# Patient Record
Sex: MALE | Race: WHITE | NOT HISPANIC OR LATINO | URBAN - NONMETROPOLITAN AREA
[De-identification: names, ages, dates, MRNs, and addresses within clinical notes are randomized per-mention and may not be internally consistent; named-entity substitution may affect disease eponyms.]

---

## 2018-01-15 ENCOUNTER — OFFICE VISIT CONVERTED (OUTPATIENT)
Dept: FAMILY MEDICINE CLINIC | Age: 33
End: 2018-01-15
Attending: NURSE PRACTITIONER

## 2018-02-21 ENCOUNTER — OFFICE VISIT CONVERTED (OUTPATIENT)
Dept: FAMILY MEDICINE CLINIC | Age: 33
End: 2018-02-21
Attending: NURSE PRACTITIONER

## 2018-05-23 ENCOUNTER — OFFICE VISIT CONVERTED (OUTPATIENT)
Dept: FAMILY MEDICINE CLINIC | Age: 33
End: 2018-05-23
Attending: NURSE PRACTITIONER

## 2018-05-29 ENCOUNTER — OFFICE VISIT CONVERTED (OUTPATIENT)
Dept: FAMILY MEDICINE CLINIC | Age: 33
End: 2018-05-29
Attending: FAMILY MEDICINE

## 2018-06-13 ENCOUNTER — OFFICE VISIT CONVERTED (OUTPATIENT)
Dept: SURGERY | Facility: CLINIC | Age: 33
End: 2018-06-13
Attending: PHYSICIAN ASSISTANT

## 2018-07-16 ENCOUNTER — OFFICE VISIT CONVERTED (OUTPATIENT)
Dept: UROLOGY | Facility: CLINIC | Age: 33
End: 2018-07-16
Attending: UROLOGY

## 2018-12-03 ENCOUNTER — OFFICE VISIT CONVERTED (OUTPATIENT)
Dept: FAMILY MEDICINE CLINIC | Age: 33
End: 2018-12-03
Attending: NURSE PRACTITIONER

## 2019-08-19 ENCOUNTER — OFFICE VISIT CONVERTED (OUTPATIENT)
Dept: UROLOGY | Facility: CLINIC | Age: 34
End: 2019-08-19
Attending: UROLOGY

## 2019-08-23 ENCOUNTER — HOSPITAL ENCOUNTER (OUTPATIENT)
Dept: OTHER | Facility: HOSPITAL | Age: 34
Discharge: HOME | End: 2019-08-23
Attending: UROLOGY

## 2019-08-30 ENCOUNTER — OFFICE VISIT CONVERTED (OUTPATIENT)
Dept: UROLOGY | Facility: CLINIC | Age: 34
End: 2019-08-30
Attending: UROLOGY

## 2021-05-15 VITALS — HEIGHT: 75 IN | RESPIRATION RATE: 16 BRPM | BODY MASS INDEX: 35.71 KG/M2 | WEIGHT: 287.25 LBS

## 2021-05-15 VITALS — RESPIRATION RATE: 16 BRPM | BODY MASS INDEX: 35.61 KG/M2 | HEIGHT: 75 IN | WEIGHT: 286.37 LBS

## 2021-05-16 VITALS — HEIGHT: 75 IN | RESPIRATION RATE: 12 BRPM | WEIGHT: 258 LBS | BODY MASS INDEX: 32.08 KG/M2

## 2021-05-16 VITALS — WEIGHT: 263.25 LBS | BODY MASS INDEX: 32.73 KG/M2 | HEIGHT: 75 IN | RESPIRATION RATE: 14 BRPM

## 2021-05-18 NOTE — PROGRESS NOTES
Martinez Hever SYD 1985     Office/Outpatient Visit    Visit Date: Mon, Dec 3, 2018 08:22 am    Provider: Em Bassett N.P. (Assistant: Hortencia Khan MA)    Location: [facility]        Electronically signed by Em Bassett N.P. on  12/03/2018 08:54:12 AM                             SUBJECTIVE:        CC:     Dickson is a 33 year old White male.  This is a follow-up visit.  Patient presents today for medication refills         HPI: Dickson is here today to follow up on his HTN and palpitations. He had been experiencing palpitations while at rest. Holter monitor was performed and showed rare PVCs. Labs were normal. He was started on Toprol XL. He notes that this has helped with the palpitations as well as his blood pressure. He has continued to avoid caffeine. Has only had palpitations when he has felt increasingly anxious or stressed. No longer having palpitations at rest. Does have some episodes of anxiety but these are manageable. Does now check his blood pressure other than his visits.    He had some hematuria and renal stone in May/June. He saw urology Dr. Vidales. Reports only one recurrent episode of blood in urine. Cystoscopy recommended but has not had performed yet.     Needs TB skin test for school. He is at [facility] for [facility].              ROS:     CONSTITUTIONAL:  Negative for chills and fever.      CARDIOVASCULAR:  Negative for chest pain and palpitations.      RESPIRATORY:  Negative for dyspnea and frequent wheezing.      GASTROINTESTINAL:  Negative for abdominal pain and vomiting.      GENITOURINARY:  Positive for hematuria (none current).   Negative for dysuria or polyuria.      NEUROLOGICAL:  Negative for dizziness and headaches.      PSYCHIATRIC:  Positive for anxiety ( (doing well) ).   Negative for suicidal thoughts.          PMH/FMH/SH:     Last Reviewed on 12/03/2018 08:34 AM by Em Bassett    Past Medical History:             PAST MEDICAL HISTORY         broken ribs after bronchitis          Surgical History:     NONE         Family History:     Father: enlarged heart, leaky valve     Mother: Healthy     Brother(s): 1 brother(s) total; 1 ;  accidental death at 27     Paternal Grandfather: Cause of death was leukemia     Paternal Grandmother: ;  Coronary Artery Disease;  Type 2 Diabetes     Maternal Grandfather: ;  Type 2 Diabetes     Maternal Grandmother: Cause of death was colon cancer         Social History:     Occupation: (Self-Employed) . Student. [employer] for [employer];     Marital Status: Single     Children: None         Tobacco/Alcohol/Supplements:     Last Reviewed on 2018 08:34 AM by Em Bassett    Tobacco: He has a past history of cigarette smoking; quit date:  2012.          Alcohol: Frequency:    rarely;         Substance Abuse History:     Last Reviewed on 2018 08:34 AM by Em Bassett    None         Mental Health History:     Last Reviewed on 2018 08:34 AM by Em Bassett    NEGATIVE         Communicable Diseases (eg STDs):     Last Reviewed on 2018 08:34 AM by Em Bassett    Reportable health conditions; NEGATIVE             Current Problems:     Last Reviewed on 2018 08:34 AM by Em Bassett    Renal stone     Use of high risk medications     Gross hematuria     Anxiety     Joint pain, other specified site     Hypertension     Palpitations         Immunizations:     Fluzone pf (3+ years dose) 1/15/2018     Fluzone Quadrivalent (3+ years) 10/28/2018     PPD 1/15/2018     PPD 2018     Adacel (Tdap) 1/15/2018         Allergies:     Last Reviewed on 2018 08:34 AM by Em Bassett    Amoxicillin:        Current Medications:     Last Reviewed on 2018 08:34 AM by Em Bassett    Toprol XL 25mg Tablets, Extended Release Take 1 tablet(s) by mouth daily         OBJECTIVE:        Vitals:         Current: 12/3/2018 8:26:31 AM    Ht:  6 ft, 3 in;  Wt: 272.8 lbs;  BMI: 34.1    T: 98.2 F (oral);  BP: 132/77 mm Hg (right  arm, sitting);  P: 65 bpm (right arm (BP Cuff), sitting);  sCr: 0.8 mg/dL;  GFR: 158.48        Exams:     PHYSICAL EXAM:     GENERAL: well developed, well nourished;  no apparent distress;     RESPIRATORY: normal respiratory rate and pattern with no distress; normal breath sounds with no rales, rhonchi, wheezes or rubs;     CARDIOVASCULAR: normal rate; rhythm is regular;     GASTROINTESTINAL: nontender; normal bowel sounds; no organomegaly;     MUSCULOSKELETAL: normal gait;     NEUROLOGIC: mental status: alert and oriented x 3; GROSSLY INTACT     PSYCHIATRIC: appropriate affect and demeanor;         Procedures:     TB screening     1. PPD placement: 0.1 cc unit dose given IM in the left upper arm; administered by [facility];  lot number [id number]; expires 12/7/20 PPD GIVEN AT 8:48AM             ASSESSMENT           401.1   I10  Hypertension              DDx:     785.1   R00.2  Palpitations              DDx:     592.0   N20.0  Renal stone              DDx:     599.71   R31.0  Gross hematuria              DDx:     V74.1   Z11.1  TB screening              DDx:         ORDERS:         Meds Prescribed:       Refill of: Toprol XL (Metoprolol Succinate) 25mg Tablets, Extended Release Take 1 tablet(s) by mouth daily  #30 (Thirty) tablet(s) Refills: 5         Lab Orders:       [id number]  [facility] CBC w/o diff  (Send-Out)         34283  COMP [facility] Comp. Metabolic Panel  (Send-Out)         76265  PPD TB test  (In-House)         APPTO  Appointment need  (In-House)                   PLAN:          Hypertension     LABORATORY:  Labs ordered to be performed today include CBC W/O DIFF and Comprehensive metabolic panel.      RECOMMENDATIONS given include: keep blood pressure log as directed and eat a low salt diet.      FOLLOW-UP: Schedule a follow-up visit in 6 months. Chronic visit follow up for Annual Checkup Patient will call to schedule follow-up appointment           Prescriptions:       Refill of: Toprol XL (Metoprolol Succinate) 25mg  Tablets, Extended Release Take 1 tablet(s) by mouth daily  #30 (Thirty) tablet(s) Refills: 5           Orders:       [id number]  BDCB2 - [facility] CBC w/o diff  (Send-Out)         63063  COMP - [facility] Comp. Metabolic Panel  (Send-Out)         APPTO  Appointment need  (In-House)            Palpitations Continue Toprol XL.          Renal stone Continue increased intake of decaffeinated fluids. Follow up with urology. Recommend cystoscopy.          Gross hematuria As above.          TB screening Follow up in 48 to 72 hours to have read.         IMMUNIZATIONS given today: PPD placed..            Orders:       [id number]  PPD TB test  (In-House)               Patient Recommendations:        For  Hypertension:     Keep a daily blood pressure log and report elevated blood pressure to provider as directed.  Schedule a follow-up visit in 6 months.              CHARGE CAPTURE           **Please note: ICD descriptions below are intended for billing purposes only and may not represent clinical diagnoses**        Primary Diagnosis:         401.1 Hypertension            I10    Essential (primary) hypertension              Orders:          [id number]   Office/outpatient visit; established patient, level 4  (In-House)             APPTO   Appointment need  (In-House)           785.1 Palpitations            R00.2    Palpitations    592.0 Renal stone            N20.0    Calculus of kidney    599.71 Gross hematuria            R31.0    Gross hematuria    V74.1 TB screening            Z11.1    Encounter for screening for respiratory tuberculosis              Orders:          [id number]   PPD TB test  (In-House)               ADDENDUMS:      ____________________________________    Addendum: 12/05/2018 10:57 AM - Rust, Dorinda        PPD 0mm induration noted./pr

## 2021-05-18 NOTE — PROGRESS NOTES
Martinez Hever SYD 1985     Office/Outpatient Visit    Visit Date: Wed, May 23, 2018 02:26 pm    Provider: Em Bassett N.P. (Assistant: Maren Lundy RN)    Location: [facility]        Electronically signed by Em Bassett N.P. on  2018 08:31:11 PM                             SUBJECTIVE:        CC:     Dickson is a 32 year old White male.  Medication Refill         HPI: Dickson is here today to follow up on his HTN and palpitations. He had been experiencing palpitations while at rest. Holter monitor was performed and showed rare PVCs. Labs were normal. He was started on Toprol XL. He notes that this has helped with the palpitations as well as his blood pressure. He has continued to avoid caffeine. Has only had palpitations when he has felt increasingly anxious or stressed. No longer having palpitations at rest. Does have some episodes of anxiety. He notes that he has had some joint pains at times. Has been eating gluten free diet and has felt better. Wonders if he might have celiac disease.         ROS:     CONSTITUTIONAL:  Negative for chills and fever.      CARDIOVASCULAR:  Positive for palpitations ( improved ).   Negative for chest pain.      RESPIRATORY:  Negative for dyspnea and frequent wheezing.      MUSCULOSKELETAL:  Negative for joint stiffness and myalgias.      NEUROLOGICAL:  Negative for dizziness and headaches.      PSYCHIATRIC:  Positive for anxiety.   Negative for suicidal thoughts.          PM/FM/:     Last Reviewed on 2017 05:37 AM by Em Bassett    Past Medical History:             PAST MEDICAL HISTORY         broken ribs after bronchitis         Surgical History:     NONE         Family History:     Father: enlarged heart, leaky valve     Mother: Healthy     Brother(s): 1 brother(s) total; 1 ;  accidental death at 27     Paternal Grandfather: Cause of death was leukemia     Paternal Grandmother: ;  Coronary Artery Disease;  Type 2 Diabetes      Maternal Grandfather: ;  Type 2 Diabetes     Maternal Grandmother: Cause of death was colon cancer         Social History:     Occupation: (Self-Employed) . Student. [employer] for [employer];     Marital Status: Single     Children: None         Tobacco/Alcohol/Supplements:     Last Reviewed on 2018 05:27 PM by Constanza Booth    Tobacco: He has a past history of cigarette smoking; quit date:  2012.          Alcohol: Frequency:    rarely;         Substance Abuse History:     Last Reviewed on 2017 05:37 AM by Em Bassett    None         Mental Health History:     Last Reviewed on 2017 05:37 AM by Em Bassett    NEGATIVE         Communicable Diseases (eg STDs):     Last Reviewed on 2017 05:37 AM by Em Bassett    Reportable health conditions; NEGATIVE             Current Problems:     Last Reviewed on 2017 05:37 AM by Em Bassett    Hypertension     Palpitations         Immunizations:     Fluzone pf (3+ years dose) 1/15/2018     PPD 1/15/2018     PPD 2018     Adacel (Tdap) 1/15/2018         Allergies:     Last Reviewed on 2018 02:29 PM by Maren Lundy    Amoxicillin:        Current Medications:     Last Reviewed on 2018 02:29 PM by Mraen Lundy    Toprol XL 25mg Tablets, Extended Release Take 1 tablet(s) by mouth daily         OBJECTIVE:        Vitals:         Historical:     2018  Wt:   260.1lbs        Current: 2018 2:29:03 PM    Ht:  6 ft, 3 in;  Wt: 255 lbs;  BMI: 31.9    T: 97.9 F (oral);  BP: 122/70 mm Hg (right arm, sitting);  P: 76 bpm (right arm (BP Cuff), sitting);  sCr: 0.94 mg/dL;  GFR: 132.26        Exams:     PHYSICAL EXAM:     GENERAL: well developed, well nourished;  no apparent distress;     RESPIRATORY: normal respiratory rate and pattern with no distress; normal breath sounds with no rales, rhonchi, wheezes or rubs;     CARDIOVASCULAR: normal rate; rhythm is regular;     MUSCULOSKELETAL: normal gait; normal range of motion of all  major muscle groups; no limb or joint pain with range of motion;     NEUROLOGIC: mental status: alert and oriented x 3; GROSSLY INTACT     PSYCHIATRIC: appropriate affect and demeanor; normal psychomotor function; normal speech pattern; normal thought and perception;         ASSESSMENT           719.48   M25.50  Joint pain, other specified site              DDx:     401.1   I10  Hypertension              DDx:     785.1   R00.2  Palpitations              DDx:     300.02   F41.9  Anxiety              DDx:         ORDERS:         Meds Prescribed:       Refill of: Toprol XL (Metoprolol Succinate) 25mg Tablets, Extended Release Take 1 tablet(s) by mouth daily  #30 (Thirty) tablet(s) Refills: 5         Lab Orders:       [id number]  RAPII - [facility] Arthritis Profile  (Send-Out)         80058  COMP - [facility] Comp. Metabolic Panel  (Send-Out)         69490  CELID - [facility] - Celiac Antibodies Panel  (Send-Out)         APPTO  Appointment need  (In-House)                   PLAN:          Joint pain, other specified site     LABORATORY:  Labs ordered to be performed today include Arthritis Profile and Celiac Antibody Panel.      RECOMMENDATIONS given include: Further recommendation to be given after test results are complete.            Orders:       [id number]  [facility]APII - [facility] Arthritis Profile  (Send-Out)         02526  CELID - [facility] - Celiac Antibodies Panel  (Send-Out)            Hypertension     LABORATORY:  Labs ordered to be performed today include Comprehensive metabolic panel.      FOLLOW-UP: Schedule a follow-up visit in 6 months.            Prescriptions:       Refill of: Toprol XL (Metoprolol Succinate) 25mg Tablets, Extended Release Take 1 tablet(s) by mouth daily  #30 (Thirty) tablet(s) Refills: 5           Orders:       [id number]  COMP - [facility] Comp. Metabolic Panel  (Send-Out)         APPTO  Appointment need  (In-House)            Palpitations Continue Toprol XL as above          Anxiety         RECOMMENDATIONS given include: Counseling.             Patient Education Handouts:       [facility]              Patient Recommendations:        For  Joint pain, other specified site:     I also recommend ^.          For  Hypertension:     Schedule a follow-up visit in 6 months.              CHARGE CAPTURE           **Please note: ICD descriptions below are intended for billing purposes only and may not represent clinical diagnoses**        Primary Diagnosis:         719.48 Joint pain, other specified site            M25.50    Pain in unspecified joint              Orders:          93910   Office/outpatient visit; established patient, level 4  (In-House)           401.1 Hypertension            I10    Essential (primary) hypertension              Orders:          APPTO   Appointment need  (In-House)           785.1 Palpitations            R00.2    Palpitations    300.02 Anxiety            F41.9    Anxiety disorder, unspecified

## 2021-05-18 NOTE — PROGRESS NOTES
Hever Henriquez 1985     Office/Outpatient Visit    Visit Date: Tue, May 29, 2018 11:07 am    Provider: Craig Kirk MD (Assistant: Iona Chan MA)    Location: [facility]        Electronically signed by Craig Kirk MD on  2018 05:47:24 PM                             SUBJECTIVE:        CC: hematuria         HPI:     Dickson is in today for follow up on blood in the urine.  He first noted this on  evening.  He says that the urine was dark and then he developed some red blood yesterday.  His urine looked normal today.  He has not had any pain with urination.  He denies drainage from the penis.  He is circumcised.  He has never had a kidney stone.  He is in no pain.     ROS:     CONSTITUTIONAL:  Negative for chills and fever.      CARDIOVASCULAR:  Negative for chest pain and palpitations.      RESPIRATORY:  Negative for recent cough and dyspnea.      GASTROINTESTINAL:  Negative for abdominal pain, nausea and vomiting.      INTEGUMENTARY:  Negative for atypical mole(s) and rash.          [facility]/:     Last Reviewed on 2018 11:25 AM by Craig Kirk    Past Medical History:             PAST MEDICAL HISTORY         broken ribs after bronchitis         Surgical History:     NONE         Family History:     Father: enlarged heart, leaky valve     Mother: Healthy     Brother(s): 1 brother(s) total; 1 ;  accidental death at 27     Paternal Grandfather: Cause of death was leukemia     Paternal Grandmother: ;  Coronary Artery Disease;  Type 2 Diabetes     Maternal Grandfather: ;  Type 2 Diabetes     Maternal Grandmother: Cause of death was colon cancer         Social History:     Occupation: (Self-Employed) . Student. [employer] for [employer];     Marital Status: Single     Children: None         Tobacco/Alcohol/Supplements:     Last Reviewed on 2018 11:25 AM by Craig Kirk    Tobacco: He has a past history of cigarette smoking;  quit date:  02/2012.          Alcohol: Frequency:    rarely;         Substance Abuse History:     Last Reviewed on 5/29/2018 11:25 AM by Craig Kirk    None         Mental Health History:     Last Reviewed on 5/29/2018 11:25 AM by Craig Kirk    NEGATIVE         Communicable Diseases (eg STDs):     Last Reviewed on 5/29/2018 11:25 AM by Craig Kirk    Reportable health conditions; NEGATIVE             Current Problems:     Last Reviewed on 5/29/2018 11:25 AM by Craig Kirk    Gross hematuria     Anxiety     Joint pain, other specified site     Hypertension     Palpitations         Immunizations:     Fluzone pf (3+ years dose) 1/15/2018     PPD 1/15/2018     PPD 1/29/2018     Adacel (Tdap) 1/15/2018         Allergies:     Last Reviewed on 5/29/2018 11:25 AM by Craig Kirk    Amoxicillin:        Current Medications:     Last Reviewed on 5/29/2018 11:25 AM by Craig Kirk    Toprol XL 25mg Tablets, Extended Release Take 1 tablet(s) by mouth daily         OBJECTIVE:        Vitals:         Current: 5/29/2018 11:10:04 AM    Ht:  6 ft, 3 in;  Wt: 256.6 lbs;  BMI: 32.1    T: 97.6 F (oral);  BP: 130/66 mm Hg (right arm, sitting);  P: 66 bpm (right arm (BP Cuff), sitting);  sCr: 0.8 mg/dL;  GFR: 155.82        Exams:     PHYSICAL EXAM:     GENERAL: Vitals recorded well developed, well nourished;     EYES: extraocular movements intact; conjunctiva and cornea are normal; PERRL;     E/N/T: EARS:  normal external auditory canals and tympanic membranes;  grossly normal hearing; OROPHARYNX:  normal mucosa, dentition, gingiva, and posterior pharynx;     NECK: range of motion is normal; thyroid is non-palpable;     RESPIRATORY: normal respiratory rate and pattern with no distress; normal breath sounds with no rales, rhonchi, wheezes or rubs;     CARDIOVASCULAR: normal rate; rhythm is regular;  no systolic murmur;     GASTROINTESTINAL: nontender; normal bowel sounds; no  masses;     SKIN:  no significant rashes or lesions; no suspicious moles;         ASSESSMENT           599.71   R31.0  Gross hematuria              DDx:     V58.69   Z79.899  Use of high risk medications              DDx:         ORDERS:         Lab Orders:       [id number]  CTNGX- [facility] Chlamydia GC swab or urine (08508, 54086)  (Send-Out)         85023  BDUAM - [facility] Urinalysis, automated, with micro  (Send-Out)         87159  URCU - [facility] Urine Culture  (Send-Out)         01637  AHEP4 - [facility] Hepatitis Panel (HAAb, HbcAB, HbsAG, Hep C antibody)  (Send-Out)         75138  HIV - [facility] HIV-1 and HIV-2 Ab   (Send-Out)         39950  RPR - [facility] RPR, Rfx Qn RPR/Confirm TP  (Send-Out)                   PLAN:          Gross hematuria     LABORATORY:  Labs ordered to be performed today include GC and Chlamydia [facility], urinalysis with micro, and Urine culture.      RECOMMENDATIONS given include: Dickson seems well today.  I don't have a clear idea of what is causing the blood.  We will run urine testing and then contact him with the results.  We also discussed getting some additional screening blood work for sexually transmitted infections.  We will move ahead with that.  No other changes for now..            Orders:       [id number]  CTNGX- [facility] Chlamydia GC swab or urine (96718, 30574)  (Send-Out)         48727  BDUAM - [facility] Urinalysis, automated, with micro  (Send-Out)         73184  URCU - [facility] Urine Culture  (Send-Out)             Patient Education Handouts:       [facility] Medication Compliance           Use of high risk medications     LABORATORY:  Labs ordered to be performed today include hepatitis panel, HIV, and RPR.            Orders:       [id number]  AHEP4 - [facility] Hepatitis Panel (HAAb, HbcAB, HbsAG, Hep C antibody)  (Send-Out)         72990  HIV - [facility] HIV-1 and HIV-2 Ab   (Send-Out)         03862  RPR - [facility] RPR, Rfx Qn RPR/Confirm TP  (Send-Out)               CHARGE CAPTURE           **Please note: ICD descriptions below are intended for  billing purposes only and may not represent clinical diagnoses**        Primary Diagnosis:         599.71 Gross hematuria            R31.0    Gross hematuria              Orders:          55210   Office/outpatient visit; established patient, level 4  (In-House)           V58.69 Use of high risk medications            Z79.899    Other long term (current) drug therapy        ADDENDUMS:      ____________________________________    Date: 06/05/2018 01:06 PM    Author: Radha Sigala         Visit Note Faxed to:        Anthony Vidales  (Surgery, Urological); Number [phone]

## 2021-05-18 NOTE — PROGRESS NOTES
Kuldeep Henriquezew SYD 1985     Office/Outpatient Visit    Visit Date:  05:25 pm    Provider: Em Bassett N.P. (Assistant: Constanza Booth MA)    Location: [facility]        Electronically signed by Em Bassett N.P. on  2018 08:49:40 AM                             SUBJECTIVE:        CC:     Dickson is a 32 year old White male.  Patient is here for check up in regards to Toprol XL.          HPI: Dickson is here today to follow up on his HTN and palpitations. He had been experiencing palpitations while at rest. Holter monitor was performed and showed rare PVCs. Labs were normal. He was started on Toprol XL one month ago. He notes that this has helped with the palpitations as well as his blood pressure. He has continued to avoid caffeine. Has only had palpitations when he has felt increasingly anxious or stressed. No longer having palpitations at rest.             ROS:     CONSTITUTIONAL:  Negative for chills and fever.      CARDIOVASCULAR:  Positive for palpitations ( improved with Toprol XL ).   Negative for chest pain.      RESPIRATORY:  Negative for dyspnea and frequent wheezing.      NEUROLOGICAL:  Negative for dizziness and headaches.      PSYCHIATRIC:  Negative for depression and suicidal thoughts.          [facility]/:     Last Reviewed on 2017 05:37 AM by Em Bassett    Past Medical History:             PAST MEDICAL HISTORY         broken ribs after bronchitis         Surgical History:     NONE         Family History:     Father: enlarged heart, leaky valve     Mother: Healthy     Brother(s): 1 brother(s) total; 1 ;  accidental death at 27     Paternal Grandfather: Cause of death was leukemia     Paternal Grandmother: ;  Coronary Artery Disease;  Type 2 Diabetes     Maternal Grandfather: ;  Type 2 Diabetes     Maternal Grandmother: Cause of death was colon cancer         Social History:     Occupation: (Self-Employed) . Student. [employer]  for [employer];     Marital Status: Single     Children: None         Tobacco/Alcohol/Supplements:     Last Reviewed on 2/21/2018 05:27 PM by Constanza Booth    Tobacco: He has a past history of cigarette smoking; quit date:  02/2012.          Alcohol: Frequency:    rarely;         Substance Abuse History:     Last Reviewed on 7/08/2017 05:37 AM by Em Bassett    None         Mental Health History:     Last Reviewed on 7/08/2017 05:37 AM by Em Bassett        Communicable Diseases (eg STDs):     Last Reviewed on 7/08/2017 05:37 AM by Em Bassett            Current Problems:     Last Reviewed on 7/08/2017 05:37 AM by Em Bassett    Screening for pulmonary tuberculosis     Elevated blood pressure without a diagnosis of hypertension     Screening for depression     Screening test for viral diseases - other     PPD screening     Palpitations         Immunizations:     Fluzone pf (3+ years dose) 1/15/2018     PPD 1/15/2018     PPD 1/29/2018     Adacel (Tdap) 1/15/2018         Allergies:     Last Reviewed on 2/21/2018 05:25 PM by Constanza Booth    Amoxicillin:        Current Medications:     Last Reviewed on 2/21/2018 05:25 PM by Constanza Booth    Toprol XL 25mg Tablets, Extended Release Take 1 tablet(s) by mouth daily         OBJECTIVE:        Vitals:         Current: 2/21/2018 5:29:22 PM    Ht:  6 ft, 3 in;  Wt: 260.1 lbs;  BMI: 32.5    T: 99.2 F (oral);  BP: 122/59 mm Hg (left arm, sitting);  P: 67 bpm (left arm (BP Cuff), sitting);  sCr: 0.94 mg/dL;  GFR: 133.38        Exams:     PHYSICAL EXAM:     GENERAL: well developed, well nourished;  no apparent distress;     RESPIRATORY: normal respiratory rate and pattern with no distress; normal breath sounds with no rales, rhonchi, wheezes or rubs;     CARDIOVASCULAR: normal rate; rhythm is regular;     MUSCULOSKELETAL: normal gait;     NEUROLOGIC: mental status: alert and oriented x 3; GROSSLY INTACT     PSYCHIATRIC: appropriate affect and demeanor;         ASSESSMENT            401.1   I10  Hypertension              DDx:     785.1   R00.2  Palpitations              DDx:         ORDERS:         Meds Prescribed:       Refill of: Toprol XL (Metoprolol Succinate) 25mg Tablets, Extended Release Take 1 tablet(s) by mouth daily  #90 (Ninety) tablet(s) Refills: 0                 PLAN:          Hypertension          RECOMMENDATIONS given include: keep blood pressure log as directed and eat a low salt diet.      FOLLOW-UP: Schedule a follow-up visit in 3 months.            Prescriptions:       Refill of: Toprol XL (Metoprolol Succinate) 25mg Tablets, Extended Release Take 1 tablet(s) by mouth daily  #90 (Ninety) tablet(s) Refills: 0          Palpitations As above. Follow up sooner for any concerns.             Patient Recommendations:        For  Hypertension:     Keep a daily blood pressure log and report elevated blood pressure to provider as directed.  Schedule a follow-up visit in 3 months.              CHARGE CAPTURE           **Please note: ICD descriptions below are intended for billing purposes only and may not represent clinical diagnoses**        Primary Diagnosis:         401.1 Hypertension            I10    Essential (primary) hypertension              Orders:          97822   Office/outpatient visit; established patient, level 3  (In-House)           785.1 Palpitations            R00.2    Palpitations

## 2021-05-18 NOTE — PROGRESS NOTES
BRANDONHever Thomas 1985     Office/Outpatient Visit    Visit Date: Mon, Jayy 15, 2018 11:41 am    Provider: Em Bassett N.P. (Assistant: Maren Lundy RN)    Location: [facility]        Electronically signed by Em Bassett N.P. on  01/17/2018 11:28:29 AM                             SUBJECTIVE:        CC:     Dickson is a 32 year old White male.  PE/High blood pressure WANTS TO DISCUSS FLU SHOT FIRST         HPI:         Patient complains of annual physical.  He cannot recall when he last had a physical exam.  He performs testicular self-exams occasionally.      Smoking Status:  Nonsmoker     Has seen neurologist since last visit here. He told him that everything was fine.     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         In regard to the palpitations, this first began approximately 2 months ago.  The frequency of the episodes is once every few days.  The average duration of each episode is 30 to 45 minutes.  He states that it is worsened after caffeine.  Better with sleep.      ROS:     CONSTITUTIONAL:  Negative for chills and fever.      EYES:  Negative for blurred vision and eye drainage.      E/N/T:  Negative for ear pain and sore throat.      CARDIOVASCULAR:  Positive for palpitations.   Negative for chest pain.      RESPIRATORY:  Negative for dyspnea and frequent wheezing.      GASTROINTESTINAL:  Negative for abdominal pain and vomiting.      MUSCULOSKELETAL:  Negative for joint stiffness and myalgias.      INTEGUMENTARY:  Negative for rash.      NEUROLOGICAL:  Negative for dizziness and headaches.      ENDOCRINE:  Negative for polydipsia and polyphagia.      PSYCHIATRIC:  Positive for anxiety ( (with palpitation episodes) ).   Negative for suicidal thoughts.          PMH/FMH/SH:     Last Reviewed on 7/08/2017 05:37 AM by Em Bassett    Past Medical History:             PAST MEDICAL HISTORY         broken ribs after bronchitis         Surgical History:      NONE         Family History:     Father: enlarged heart, leaky valve     Mother: Healthy     Brother(s): 1 brother(s) total; 1 ;  accidental death at 27     Paternal Grandfather: Cause of death was leukemia     Paternal Grandmother: ;  Coronary Artery Disease;  Type 2 Diabetes     Maternal Grandfather: ;  Type 2 Diabetes     Maternal Grandmother: Cause of death was colon cancer         Social History:     Occupation: (Self-Employed) . Student. [employer] for [employer];     Marital Status: Single     Children: None         Tobacco/Alcohol/Supplements:     Last Reviewed on 1/15/2018 11:43 AM by Maren Lundy    Tobacco: He has a past history of cigarette smoking; quit date:  2012.          Alcohol: Frequency:    rarely;         Substance Abuse History:     Last Reviewed on 2017 05:37 AM by Em Bassett         Mental Health History:     Last Reviewed on 2017 05:37 AM by Em Bassett        Communicable Diseases (eg STDs):     Last Reviewed on 2017 05:37 AM by Em Bassett            Current Problems:     Last Reviewed on 2017 05:37 AM by Em Bassett    PPD screening     Palpitations         Immunizations:     None        Allergies:     Last Reviewed on 1/15/2018 11:43 AM by Maren Lundy    Amoxicillin:        Current Medications:     Last Reviewed on 1/15/2018 11:43 AM by Maren Lundy      No Known Medications.         OBJECTIVE:        Vitals:         Current: 1/15/2018 11:43:21 AM    Ht:  6 ft, 3 in;  Wt: 264 lbs;  WC: 48 inches;  BMI: 33.0    T: 99.2 F (oral);  BP: 148/90 mm Hg (left arm, sitting);  P: 104 bpm (left arm (BP Cuff), sitting);  sCr: 1.01 mg/dL;  GFR: 124.92        Repeat:     12:33:27 PM     BP:   140/82mm Hg (left arm, sitting)         Exams:     PHYSICAL EXAM:     GENERAL: well developed, well nourished;  no apparent distress;     EYES: PERRL, EOMI     E/N/T: EARS: external auditory canal normal;  bilateral TMs are normal;  NOSE: normal  turbinates; no sinus tenderness; OROPHARYNX: oral mucosa is normal; posterior pharynx shows no exudate;     NECK: range of motion is normal; trachea is midline;     RESPIRATORY: normal respiratory rate and pattern with no distress; normal breath sounds with no rales, rhonchi, wheezes or rubs;     CARDIOVASCULAR: normal rate; rhythm is regular;     GASTROINTESTINAL: nontender; normal bowel sounds; no organomegaly;     MUSCULOSKELETAL: normal gait;     NEUROLOGIC: mental status: alert and oriented x 3; GROSSLY INTACT     PSYCHIATRIC: appropriate affect and demeanor;         Lab/Test Results:         LABORATORY RESULTS: EKG performed by tls         Procedures:     Tdap vaccination     1. Adacel (Tdap): 0.5 ml unit dose given IM in the right upper arm; administered by [facility];  lot number [id number]; expires 12/14/19         Influenza vaccination     1. Influenza, seasonal PF (children 3 years to adult): 0.5 ml unit dose given IM in the left upper arm; administered by [facility];  lot number [id number]; expires 6/30/18         PPD screening     1. PPD placement: 0.1 ml unit dose given intradermally left forearm; administered by [facility];  lot number [id number]; expires 5/25/19             ASSESSMENT           V70.0   Z00.01  Annual physical              DDx:     785.1   R00.2  Palpitations              DDx:     796.2   R03.0  Elevated blood pressure without a diagnosis of hypertension              DDx:     V06.1   Z23  Tdap vaccination              DDx:     V04.81   Z23  Influenza vaccination              DDx:     V74.1   Z11.1  PPD screening              DDx:     V73.89   Z11.59  Screening test for viral diseases - other              DDx:     V79.0   Z13.89  Screening for depression              DDx:         ORDERS:         Radiology/Test Orders:       32138  Electrocardiogram, routine with at least 12 leads; with interpretation and report  (In-House)         96415  Holter monitor connection and disconnection  (In-House)  (48 hour holter  placed./tls)         Lab Orders:       12962  RUBEO - [facility] Rubeola Antibody IgG; Measles  (Send-Out)         88436  MUPIG - [facility] Mumps virus antibody  (Send-Out)         00788  RUBG - [facility] Rubella IgG antibody  (Send-Out)         02544  HBSAB - H Hep B Surface Ab  (Send-Out)         12224  VARZO - [facility] Varicella-zoster antibody  (Send-Out)         72154  PHYSF - [facility] PHYSICAL: CMP, CBC, TSH, LIPID: [id number], [id number], 78840, 79450  (Send-Out)         49072  MG - [facility] Magnesium, Serum  (Send-Out)         41357  PPD TB test  (In-House)  (Pt will return iin approx two weeks to receive 2nd PPD for school./[facility])         Procedures Ordered:       08472  Tdap vaccine, when administered to individuals 7 years or older, for intramuscular use  (In-House)         57698  Immunization administration; one vaccine  (In-House)         41777  Immunization administration; each additional vaccine/toxoid  (In-House)           Other Orders:       87748  Influenza virus vaccine, quadrivalent, split virus, preservative free 3 years of age & older  (In-House)         89887  Behav assmt w/score & docd/stand instrument  (In-House)           Depression screen negative  (In-House)                   PLAN:          Annual physical    LABORATORY:  Labs ordered to be performed today include PHYSICAL PANEL; CMP, CBC, TSH, LIPID.      COUNSELING was provided today regarding the following topics: healthy eating habits and testicular self-exam.            Orders:       82907  PHYSF - [facility] PHYSICAL: CMP, CBC, TSH, LIPID: [id number], [id number], [id number], 57505  (Send-Out)            Palpitations     LABORATORY:  Labs ordered to be performed today include Magnesium level.      TESTS/PROCEDURES:  Will proceed with an ECG and Holter Monitor 48 hour to be performed/scheduled now.      RECOMMENDATIONS given include: Further recommendation to be given after test results are complete.      FOLLOW-UP: Schedule follow-up appointments on a p.r.n. basis. Schedule a follow-up  appointment in 2 weeks.      RECOMMENDATIONS given include: Avoid caffeine.            Orders:       78809  Electrocardiogram, routine with at least 12 leads; with interpretation and report  (In-House)         58219  MG - H Magnesium, Serum  (Send-Out)         05410  Holter monitor connection and disconnection  (In-House)  (48 hour holter placed./tls)          Elevated blood pressure without a diagnosis of hypertension Discussed with patient likely starting beta blocker for his blood pressure as well as palpitations based on Holter monitor readings. To monitor and call if increases.          Tdap vaccination         IMMUNIZATIONS given today: TDAP.            Orders:       52464  Tdap vaccine, when administered to individuals 7 years or older, for intramuscular use  (In-House)         82004  Immunization administration; one vaccine  (In-House)            Influenza vaccination         IMMUNIZATIONS given today: Influenza Quadrivalent psv free shot 3 & up.            Orders:       12659  Influenza virus vaccine, quadrivalent, split virus, preservative free 3 years of age & older  (In-House)         45777  Immunization administration; each additional vaccine/toxoid  (In-House)            PPD screening           Orders:       65446  PPD TB test  (In-House)  (Pt will return iin approx two weeks to receive 2nd PPD for school./[facility])          Screening test for viral diseases - other     LABORATORY:  Labs ordered to be performed today include Hepatitis B Surface Ab, MMR Screening Rubeola Mumps Antibody Rubella IgG, and Varicella Zoster Antibody.            Orders:       75731  RUBEO - [facility] Rubeola Antibody IgG; Measles  (Send-Out)         65191  MUPIG - [facility] Mumps virus antibody  (Send-Out)         72176  RUBG - H Rubella IgG antibody  (Send-Out)         77670  HBSAB - [facility] Hep B Surface Ab  (Send-Out)         78091  VARZO - H Varicella-zoster antibody  (Send-Out)            Screening for depression     MIPS PHQ-9  Depression Screening: Completed form scanned and in chart; Total Score 8           Orders:       24710  Behav assmt w/score & docd/stand instrument  (In-House)           Depression screen negative  (In-House)               Patient Recommendations:        Annual physical    Limit dietary intake of fat (especially saturated fat) and cholesterol.  Eat a variety of foods, including plenty of fruits, vegetables, and grain containg fiber, limit fat intake to 30% of total calories. Balance caloric intake with energy expended.    Testicular cancer is the #1 cancer in men ages 15-35.  You should regularly examine your testicles for knots, lumps, or tenderness. Testicular pain, even if not associated with any masses, needs to be evaluated by your doctor!          For  Palpitations:     Schedule follow-up appointments as needed. Schedule a follow-up visit in 2 weeks.              CHARGE CAPTURE           **Please note: ICD descriptions below are intended for billing purposes only and may not represent clinical diagnoses**        Primary Diagnosis:         V70.0 Annual physical            Z00.01    Encounter for general adult medical exam w abnormal findings              Orders:          11760   Preventive medicine, established patient, age 18-39 years  (In-House)           785.1 Palpitations            R00.2    Palpitations              Orders:          52009 -25  Office/outpatient visit; established patient, level 3  (In-House)             22267   Electrocardiogram, routine with at least 12 leads; with interpretation and report  (In-House)             71737   Holter monitor connection and disconnection  (In-House)  (48 hour holter placed./tls)         796.2 Elevated blood pressure without a diagnosis of hypertension            R03.0    Elevated blood-pressure reading, without diagnosis of hypertension    V06.1 Tdap vaccination            Z23    Encounter for immunization              Orders:          65595   Tdap  vaccine, when administered to individuals 7 years or older, for intramuscular use  (In-House)             68564   Immunization administration; one vaccine  (In-House)           V04.81 Influenza vaccination            Z23    Encounter for immunization              Orders:          96961   Influenza virus vaccine, quadrivalent, split virus, preservative free 3 years of age & older  (In-House)             64104   Immunization administration; each additional vaccine/toxoid  (In-House)           V74.1 PPD screening            Z11.1    Encounter for screening for respiratory tuberculosis              Orders:          29485   PPD TB test  (In-House)  (Pt will return iin approx two weeks to receive 2nd PPD for school./[facility])         V73.89 Screening test for viral diseases - other            Z11.59    Encounter for screening for other viral diseases    V79.0 Screening for depression            Z13.89    Encounter for screening for other disorder              Orders:          70570   Behav assmt w/score & docd/stand instrument  (In-House)                Depression screen negative  (In-House)               ADDENDUMS:      ____________________________________    Addendum: 01/17/2018 01:19 PM - Dorinda Mendez        PPD read left forearm 0 mm induration/ pdr        Addendum: 01/17/2018 02:54 PM - Vivian Ojeda        Holter returned 1/17/18, uploaded to [facility].

## 2021-07-01 VITALS
HEIGHT: 75 IN | DIASTOLIC BLOOD PRESSURE: 59 MMHG | SYSTOLIC BLOOD PRESSURE: 122 MMHG | TEMPERATURE: 99.2 F | BODY MASS INDEX: 32.34 KG/M2 | WEIGHT: 260.1 LBS | HEART RATE: 67 BPM

## 2021-07-01 VITALS
HEART RATE: 65 BPM | WEIGHT: 272.8 LBS | DIASTOLIC BLOOD PRESSURE: 77 MMHG | HEIGHT: 75 IN | SYSTOLIC BLOOD PRESSURE: 132 MMHG | BODY MASS INDEX: 33.92 KG/M2 | TEMPERATURE: 98.2 F

## 2021-07-01 VITALS
BODY MASS INDEX: 32.83 KG/M2 | DIASTOLIC BLOOD PRESSURE: 82 MMHG | SYSTOLIC BLOOD PRESSURE: 140 MMHG | TEMPERATURE: 99.2 F | HEIGHT: 75 IN | WEIGHT: 264 LBS | HEART RATE: 104 BPM

## 2021-07-01 VITALS
HEART RATE: 76 BPM | DIASTOLIC BLOOD PRESSURE: 70 MMHG | TEMPERATURE: 97.9 F | SYSTOLIC BLOOD PRESSURE: 122 MMHG | BODY MASS INDEX: 31.71 KG/M2 | WEIGHT: 255 LBS | HEIGHT: 75 IN

## 2021-07-01 VITALS
BODY MASS INDEX: 31.9 KG/M2 | WEIGHT: 256.6 LBS | SYSTOLIC BLOOD PRESSURE: 130 MMHG | TEMPERATURE: 97.6 F | DIASTOLIC BLOOD PRESSURE: 66 MMHG | HEIGHT: 75 IN | HEART RATE: 66 BPM